# Patient Record
Sex: FEMALE | Race: BLACK OR AFRICAN AMERICAN | Employment: OTHER | URBAN - METROPOLITAN AREA
[De-identification: names, ages, dates, MRNs, and addresses within clinical notes are randomized per-mention and may not be internally consistent; named-entity substitution may affect disease eponyms.]

---

## 2017-03-27 ENCOUNTER — HOSPITAL ENCOUNTER (OUTPATIENT)
Dept: MAMMOGRAPHY | Age: 67
Discharge: HOME | End: 2017-03-27
Attending: INTERNAL MEDICINE
Payer: MEDICARE

## 2017-03-27 DIAGNOSIS — N64.89 BREAST ASYMMETRY: ICD-10-CM

## 2017-03-27 PROCEDURE — 77065 DX MAMMO INCL CAD UNI: CPT

## 2017-10-11 ENCOUNTER — HOSPITAL ENCOUNTER (OUTPATIENT)
Dept: MAMMOGRAPHY | Age: 67
Discharge: HOME | End: 2017-10-11
Attending: INTERNAL MEDICINE
Payer: MEDICARE

## 2017-10-11 DIAGNOSIS — Z12.31 VISIT FOR SCREENING MAMMOGRAM: ICD-10-CM

## 2017-10-11 PROCEDURE — 77067 SCR MAMMO BI INCL CAD: CPT

## 2017-12-13 ENCOUNTER — HOSPITAL ENCOUNTER (OUTPATIENT)
Age: 67
Setting detail: OBSERVATION
Discharge: HOME | End: 2017-12-14
Attending: EMERGENCY MEDICINE | Admitting: INTERNAL MEDICINE
Payer: MEDICARE

## 2017-12-13 ENCOUNTER — APPOINTMENT (OUTPATIENT)
Dept: CT IMAGING | Age: 67
End: 2017-12-13
Attending: EMERGENCY MEDICINE
Payer: MEDICARE

## 2017-12-13 DIAGNOSIS — G45.9 TRANSIENT CEREBRAL ISCHEMIA, UNSPECIFIED TYPE: Primary | ICD-10-CM

## 2017-12-13 LAB
ALBUMIN SERPL-MCNC: 3.2 G/DL (ref 3.2–4.6)
ALBUMIN/GLOB SERPL: 0.7 {RATIO} (ref 1.2–3.5)
ALP SERPL-CCNC: 69 U/L (ref 50–136)
ALT SERPL-CCNC: 28 U/L (ref 12–65)
ANION GAP SERPL CALC-SCNC: 13 MMOL/L (ref 7–16)
AST SERPL-CCNC: 60 U/L (ref 15–37)
BASOPHILS # BLD: 0.1 K/UL (ref 0–0.2)
BASOPHILS NFR BLD: 1 % (ref 0–2)
BILIRUB SERPL-MCNC: 0.3 MG/DL (ref 0.2–1.1)
BUN SERPL-MCNC: 11 MG/DL (ref 8–23)
CALCIUM SERPL-MCNC: 8.8 MG/DL (ref 8.3–10.4)
CHLORIDE SERPL-SCNC: 107 MMOL/L (ref 98–107)
CO2 SERPL-SCNC: 21 MMOL/L (ref 21–32)
CREAT SERPL-MCNC: 0.96 MG/DL (ref 0.6–1)
DIFFERENTIAL METHOD BLD: ABNORMAL
EOSINOPHIL # BLD: 0.1 K/UL (ref 0–0.8)
EOSINOPHIL NFR BLD: 2 % (ref 0.5–7.8)
ERYTHROCYTE [DISTWIDTH] IN BLOOD BY AUTOMATED COUNT: 14.7 % (ref 11.9–14.6)
GLOBULIN SER CALC-MCNC: 4.9 G/DL (ref 2.3–3.5)
GLUCOSE SERPL-MCNC: 118 MG/DL (ref 65–100)
HCT VFR BLD AUTO: 45.7 % (ref 35.8–46.3)
HGB BLD-MCNC: 15.1 G/DL (ref 11.7–15.4)
IMM GRANULOCYTES # BLD: 0 K/UL (ref 0–0.5)
IMM GRANULOCYTES NFR BLD AUTO: 0 % (ref 0–5)
LYMPHOCYTES # BLD: 3.6 K/UL (ref 0.5–4.6)
LYMPHOCYTES NFR BLD: 49 % (ref 13–44)
MCH RBC QN AUTO: 28 PG (ref 26.1–32.9)
MCHC RBC AUTO-ENTMCNC: 33 G/DL (ref 31.4–35)
MCV RBC AUTO: 84.6 FL (ref 79.6–97.8)
MONOCYTES # BLD: 0.8 K/UL (ref 0.1–1.3)
MONOCYTES NFR BLD: 12 % (ref 4–12)
NEUTS SEG # BLD: 2.6 K/UL (ref 1.7–8.2)
NEUTS SEG NFR BLD: 36 % (ref 43–78)
PLATELET # BLD AUTO: 121 K/UL (ref 150–450)
PMV BLD AUTO: 10.6 FL (ref 10.8–14.1)
POTASSIUM SERPL-SCNC: 4.9 MMOL/L (ref 3.5–5.1)
POTASSIUM SERPL-SCNC: 6.5 MMOL/L (ref 3.5–5.1)
PROT SERPL-MCNC: 8.1 G/DL (ref 6.3–8.2)
RBC # BLD AUTO: 5.4 M/UL (ref 4.05–5.25)
SODIUM SERPL-SCNC: 141 MMOL/L (ref 136–145)
WBC # BLD AUTO: 7.3 K/UL (ref 4.3–11.1)

## 2017-12-13 PROCEDURE — 93005 ELECTROCARDIOGRAM TRACING: CPT | Performed by: EMERGENCY MEDICINE

## 2017-12-13 PROCEDURE — 80053 COMPREHEN METABOLIC PANEL: CPT | Performed by: EMERGENCY MEDICINE

## 2017-12-13 PROCEDURE — 70450 CT HEAD/BRAIN W/O DYE: CPT

## 2017-12-13 PROCEDURE — 99285 EMERGENCY DEPT VISIT HI MDM: CPT | Performed by: EMERGENCY MEDICINE

## 2017-12-13 PROCEDURE — 99218 HC RM OBSERVATION: CPT

## 2017-12-13 PROCEDURE — 84132 ASSAY OF SERUM POTASSIUM: CPT | Performed by: EMERGENCY MEDICINE

## 2017-12-13 PROCEDURE — 85025 COMPLETE CBC W/AUTO DIFF WBC: CPT | Performed by: EMERGENCY MEDICINE

## 2017-12-13 RX ORDER — HYDRALAZINE HYDROCHLORIDE 20 MG/ML
10 INJECTION INTRAMUSCULAR; INTRAVENOUS
Status: DISCONTINUED | OUTPATIENT
Start: 2017-12-13 | End: 2017-12-14 | Stop reason: HOSPADM

## 2017-12-13 RX ORDER — IBUPROFEN 200 MG
1 TABLET ORAL DAILY
Status: DISCONTINUED | OUTPATIENT
Start: 2017-12-13 | End: 2017-12-13 | Stop reason: SDUPTHER

## 2017-12-13 RX ORDER — IBUPROFEN 200 MG
1 TABLET ORAL EVERY 24 HOURS
Status: DISCONTINUED | OUTPATIENT
Start: 2017-12-13 | End: 2017-12-14 | Stop reason: HOSPADM

## 2017-12-13 NOTE — Clinical Note
Patient Class[de-identified] Observation [913] Type of Bed: Remote Telemetry [29] Reason for Observation: visual eye loss, concern for TIA versus CVA Admitting Diagnosis: TIA (transient ischemic attack) [308881] Admitting Physician: Ezra Reddy [address] [place] [address] [[id number]] Attending Physician: Ezra Reddy [address] [place] [address] [[id number]]

## 2017-12-14 ENCOUNTER — APPOINTMENT (OUTPATIENT)
Dept: ULTRASOUND IMAGING | Age: 67
End: 2017-12-14
Attending: INTERNAL MEDICINE
Payer: MEDICARE

## 2017-12-14 ENCOUNTER — APPOINTMENT (OUTPATIENT)
Dept: MRI IMAGING | Age: 67
End: 2017-12-14
Attending: INTERNAL MEDICINE
Payer: MEDICARE

## 2017-12-14 VITALS
TEMPERATURE: 98.2 F | BODY MASS INDEX: 31.84 KG/M2 | HEART RATE: 72 BPM | HEIGHT: 69 IN | DIASTOLIC BLOOD PRESSURE: 76 MMHG | OXYGEN SATURATION: 98 % | WEIGHT: 215 LBS | SYSTOLIC BLOOD PRESSURE: 150 MMHG | RESPIRATION RATE: 27 BRPM

## 2017-12-14 LAB
ANION GAP SERPL CALC-SCNC: 8 MMOL/L (ref 7–16)
ATRIAL RATE: 83 BPM
BASOPHILS # BLD: 0 K/UL (ref 0–0.2)
BASOPHILS NFR BLD: 1 % (ref 0–2)
BUN SERPL-MCNC: 8 MG/DL (ref 8–23)
CALCIUM SERPL-MCNC: 9 MG/DL (ref 8.3–10.4)
CALCULATED P AXIS, ECG09: 75 DEGREES
CALCULATED R AXIS, ECG10: 34 DEGREES
CALCULATED T AXIS, ECG11: 90 DEGREES
CHLORIDE SERPL-SCNC: 107 MMOL/L (ref 98–107)
CHOLEST SERPL-MCNC: 215 MG/DL
CO2 SERPL-SCNC: 26 MMOL/L (ref 21–32)
CREAT SERPL-MCNC: 0.79 MG/DL (ref 0.6–1)
DIAGNOSIS, 93000: NORMAL
DIFFERENTIAL METHOD BLD: ABNORMAL
EOSINOPHIL # BLD: 0.1 K/UL (ref 0–0.8)
EOSINOPHIL NFR BLD: 1 % (ref 0.5–7.8)
ERYTHROCYTE [DISTWIDTH] IN BLOOD BY AUTOMATED COUNT: 14.3 % (ref 11.9–14.6)
EST. AVERAGE GLUCOSE BLD GHB EST-MCNC: 131 MG/DL
GLUCOSE SERPL-MCNC: 115 MG/DL (ref 65–100)
HBA1C MFR BLD: 6.2 % (ref 4.8–6)
HCT VFR BLD AUTO: 40.6 % (ref 35.8–46.3)
HDLC SERPL-MCNC: 57 MG/DL (ref 40–60)
HDLC SERPL: 3.8 {RATIO}
HGB BLD-MCNC: 13.7 G/DL (ref 11.7–15.4)
IMM GRANULOCYTES # BLD: 0 K/UL (ref 0–0.5)
IMM GRANULOCYTES NFR BLD AUTO: 0 % (ref 0–5)
LDLC SERPL CALC-MCNC: 115.6 MG/DL
LIPID PROFILE,FLP: ABNORMAL
LYMPHOCYTES # BLD: 2.6 K/UL (ref 0.5–4.6)
LYMPHOCYTES NFR BLD: 40 % (ref 13–44)
MAGNESIUM SERPL-MCNC: 2 MG/DL (ref 1.8–2.4)
MCH RBC QN AUTO: 28 PG (ref 26.1–32.9)
MCHC RBC AUTO-ENTMCNC: 33.7 G/DL (ref 31.4–35)
MCV RBC AUTO: 82.9 FL (ref 79.6–97.8)
MONOCYTES # BLD: 0.6 K/UL (ref 0.1–1.3)
MONOCYTES NFR BLD: 8 % (ref 4–12)
NEUTS SEG # BLD: 3.3 K/UL (ref 1.7–8.2)
NEUTS SEG NFR BLD: 50 % (ref 43–78)
P-R INTERVAL, ECG05: 150 MS
PHOSPHATE SERPL-MCNC: 3.2 MG/DL (ref 2.3–3.7)
PLATELET # BLD AUTO: 240 K/UL (ref 150–450)
PMV BLD AUTO: 9.7 FL (ref 10.8–14.1)
POTASSIUM SERPL-SCNC: 3.8 MMOL/L (ref 3.5–5.1)
Q-T INTERVAL, ECG07: 398 MS
QRS DURATION, ECG06: 88 MS
QTC CALCULATION (BEZET), ECG08: 467 MS
RBC # BLD AUTO: 4.9 M/UL (ref 4.05–5.25)
SODIUM SERPL-SCNC: 141 MMOL/L (ref 136–145)
TRIGL SERPL-MCNC: 212 MG/DL (ref 35–150)
VENTRICULAR RATE, ECG03: 83 BPM
VLDLC SERPL CALC-MCNC: 42.4 MG/DL (ref 6–23)
WBC # BLD AUTO: 6.6 K/UL (ref 4.3–11.1)

## 2017-12-14 PROCEDURE — 83735 ASSAY OF MAGNESIUM: CPT | Performed by: INTERNAL MEDICINE

## 2017-12-14 PROCEDURE — 99218 HC RM OBSERVATION: CPT

## 2017-12-14 PROCEDURE — 93880 EXTRACRANIAL BILAT STUDY: CPT

## 2017-12-14 PROCEDURE — 74011250637 HC RX REV CODE- 250/637: Performed by: INTERNAL MEDICINE

## 2017-12-14 PROCEDURE — 80061 LIPID PANEL: CPT | Performed by: INTERNAL MEDICINE

## 2017-12-14 PROCEDURE — 70551 MRI BRAIN STEM W/O DYE: CPT

## 2017-12-14 PROCEDURE — G8979 MOBILITY GOAL STATUS: HCPCS

## 2017-12-14 PROCEDURE — 84100 ASSAY OF PHOSPHORUS: CPT | Performed by: INTERNAL MEDICINE

## 2017-12-14 PROCEDURE — 85025 COMPLETE CBC W/AUTO DIFF WBC: CPT | Performed by: INTERNAL MEDICINE

## 2017-12-14 PROCEDURE — G8996 SWALLOW CURRENT STATUS: HCPCS

## 2017-12-14 PROCEDURE — G8997 SWALLOW GOAL STATUS: HCPCS

## 2017-12-14 PROCEDURE — G8978 MOBILITY CURRENT STATUS: HCPCS

## 2017-12-14 PROCEDURE — 97161 PT EVAL LOW COMPLEX 20 MIN: CPT

## 2017-12-14 PROCEDURE — 92610 EVALUATE SWALLOWING FUNCTION: CPT

## 2017-12-14 PROCEDURE — 83036 HEMOGLOBIN GLYCOSYLATED A1C: CPT | Performed by: INTERNAL MEDICINE

## 2017-12-14 PROCEDURE — 80048 BASIC METABOLIC PNL TOTAL CA: CPT | Performed by: INTERNAL MEDICINE

## 2017-12-14 PROCEDURE — G8980 MOBILITY D/C STATUS: HCPCS

## 2017-12-14 PROCEDURE — 74011250636 HC RX REV CODE- 250/636: Performed by: INTERNAL MEDICINE

## 2017-12-14 PROCEDURE — G8998 SWALLOW D/C STATUS: HCPCS

## 2017-12-14 PROCEDURE — 96372 THER/PROPH/DIAG INJ SC/IM: CPT | Performed by: EMERGENCY MEDICINE

## 2017-12-14 RX ORDER — ACETAMINOPHEN 325 MG/1
650 TABLET ORAL
Status: DISCONTINUED | OUTPATIENT
Start: 2017-12-14 | End: 2017-12-14 | Stop reason: HOSPADM

## 2017-12-14 RX ORDER — ATORVASTATIN CALCIUM 40 MG/1
40 TABLET, FILM COATED ORAL DAILY
Qty: 30 TAB | Refills: 11 | Status: SHIPPED | OUTPATIENT
Start: 2017-12-14

## 2017-12-14 RX ORDER — ASPIRIN 325 MG
325 TABLET ORAL DAILY
Status: DISCONTINUED | OUTPATIENT
Start: 2017-12-14 | End: 2017-12-14 | Stop reason: HOSPADM

## 2017-12-14 RX ORDER — ONDANSETRON 2 MG/ML
4 INJECTION INTRAMUSCULAR; INTRAVENOUS
Status: DISCONTINUED | OUTPATIENT
Start: 2017-12-14 | End: 2017-12-14 | Stop reason: HOSPADM

## 2017-12-14 RX ORDER — FACIAL-BODY WIPES
10 EACH TOPICAL DAILY PRN
Status: DISCONTINUED | OUTPATIENT
Start: 2017-12-14 | End: 2017-12-14 | Stop reason: HOSPADM

## 2017-12-14 RX ORDER — HEPARIN SODIUM 5000 [USP'U]/ML
5000 INJECTION, SOLUTION INTRAVENOUS; SUBCUTANEOUS EVERY 8 HOURS
Status: DISCONTINUED | OUTPATIENT
Start: 2017-12-14 | End: 2017-12-14 | Stop reason: SDUPTHER

## 2017-12-14 RX ORDER — HEPARIN SODIUM 5000 [USP'U]/ML
5000 INJECTION, SOLUTION INTRAVENOUS; SUBCUTANEOUS EVERY 8 HOURS
Status: DISCONTINUED | OUTPATIENT
Start: 2017-12-14 | End: 2017-12-14 | Stop reason: HOSPADM

## 2017-12-14 RX ORDER — CLOPIDOGREL BISULFATE 75 MG/1
75 TABLET ORAL DAILY
Qty: 30 TAB | Refills: 2 | Status: SHIPPED | OUTPATIENT
Start: 2017-12-14

## 2017-12-14 RX ORDER — HYDROCHLOROTHIAZIDE 25 MG/1
25 TABLET ORAL DAILY
Qty: 30 TAB | Refills: 11 | Status: SHIPPED | OUTPATIENT
Start: 2017-12-14

## 2017-12-14 RX ORDER — TRAMADOL HYDROCHLORIDE 50 MG/1
50 TABLET ORAL
Status: DISCONTINUED | OUTPATIENT
Start: 2017-12-14 | End: 2017-12-14 | Stop reason: HOSPADM

## 2017-12-14 RX ORDER — SODIUM CHLORIDE 0.9 % (FLUSH) 0.9 %
5-10 SYRINGE (ML) INJECTION AS NEEDED
Status: DISCONTINUED | OUTPATIENT
Start: 2017-12-14 | End: 2017-12-14 | Stop reason: HOSPADM

## 2017-12-14 RX ORDER — SODIUM CHLORIDE 0.9 % (FLUSH) 0.9 %
5-10 SYRINGE (ML) INJECTION EVERY 8 HOURS
Status: DISCONTINUED | OUTPATIENT
Start: 2017-12-14 | End: 2017-12-14 | Stop reason: HOSPADM

## 2017-12-14 RX ORDER — PRAVASTATIN SODIUM 20 MG/1
40 TABLET ORAL
Status: DISCONTINUED | OUTPATIENT
Start: 2017-12-14 | End: 2017-12-14 | Stop reason: HOSPADM

## 2017-12-14 RX ADMIN — HEPARIN SODIUM 5000 UNITS: 5000 INJECTION INTRAVENOUS; SUBCUTANEOUS at 05:29

## 2017-12-14 RX ADMIN — ASPIRIN 325 MG ORAL TABLET 325 MG: 325 PILL ORAL at 09:15

## 2017-12-14 NOTE — ED NOTES
Bedside shift report received from [facility] for continuation of care. Pt daughter at bedside. Respirations even and unlabored. Pt vital signs being monitored.
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
## 2017-12-14 NOTE — ED NOTES
Stroke Education provided to patient and the following topics were discussed    1. Patients personal risk factors for stroke are smoking, hypertension, high cholesterol     2. Warning signs of Stroke:        * Sudden numbness or weakness of the face, arm or leg, especially on one side of           The body            * Sudden confusion, trouble speaking or understanding        * Sudden trouble seeing in one or both eyes        * Sudden trouble walking, dizziness, loss of balance or coordination        * Sudden severe headache with no known cause      3. Importance of activation Emergency Medical Services ( 9-1-1 ) immediately if experience any warning signs of stroke. 4. Be sure and schedule a follow-up appointment with your primary care doctor or any specialists as instructed. 5. You must take medicine every day to treat your risk factors for stroke. Be sure to take your medicines exactly as your doctor tells you: no more, no less. Know what your medicines are for , what they do. Anti-thrombotics /anticoagulants can help prevent strokes. 6.  Smoking and second-hand smoke greatly increase your risk of stroke, cardiovascular disease and death. Smoking history current smoker but trying to quite    7. Information provided was [facility] Stroke Education Binder    8. Stroke Navigator phone number given to patient    patient verbalized understanding.

## 2017-12-14 NOTE — ED NOTES
Verbal report to [facility] for continuation of care. Pt in [address] at this time.

## 2017-12-14 NOTE — PROGRESS NOTES
Problem: Dysphagia (Adult)  Goal: *Acute Goals and Plan of Care (Insert Text)  OBSERVATION Speech language pathology: bedside swallow note: Initial Assessment and Discharge    NAME/AGE/GENDER: Bruna Garcia is a 69 y.o. female  DATE: 12/14/2017  PRIMARY DIAGNOSIS: TIA (transient ischemic attack)       ICD-10: Treatment Diagnosis: dysphagia, other 13.19  INTERDISCIPLINARY COLLABORATION: Registered Nurse  PRECAUTIONS/ALLERGIES: Review of patient's allergies indicates no known allergies. ASSESSMENT:Based on the objective data described below, Ms. Sandoval swallowing is essentially WFL. Pt given trials thin liquids, mixed consistency and pureed. Also observed taking whole medication administered by RN. No signs/sx aspiration. Mastication WFL. Pt's speech and cognition appears intact. Noted CT was negative for an acute event. Therefore, no further ST indicated. PLAN OF CARE:   Patient will benefit from skilled intervention to address the following impairments. RECOMMENDATIONS:  · continue prescribed diet  MEDICATIONS:  · With liquid  COMPENSATORY STRATEGIES/MODIFICATIONS INCLUDING:  · None  OTHER RECOMMENDATIONS (including follow up treatment recommendations):   · NA  RECOMMENDED DIET MODIFICATIONS DISCUSSED WITH:  · Nursing  · Family  · Patient  RECOMMENDED REHABILITATION/EQUIPMENT: (at time of discharge pending progress): Due to the probability of continued deficits (see above) this patient will not likely need continued skilled speech therapy after discharge. SUBJECTIVE:   Pt cooperative. Several family members present. History of Present Injury/Illness: Ms. Mychal Kern  has a past medical history of Hyperlipemia; Hypertension; and TIA (transient ischemic attack) (12/13/2017). She also has no past medical history of Aneurysm ([facility] Arrhythmia; Arthritis; Asthma; Autoimmune disease ([facility] CAD (coronary artery disease); Cancer ([facility]  Chronic kidney disease; Chronic pain; Coagulation defects; COPD; Diabetes ([facility] [facility] Difficult intubation; GERD (gastroesophageal reflux disease); Heart failure ([facility] [facility] Liver disease; Malignant hyperthermia due to anesthesia; Morbid obesity ([facility] Nausea & vomiting; Other ill-defined conditions(799.89); Pseudocholinesterase deficiency; Psychiatric disorder; PUD (peptic ulcer disease); Seizures ([facility] [facility] Thromboembolus ([facility] Thyroid disease; Unspecified adverse effect of anesthesia; or Unspecified sleep apnea. .   She also  has a past surgical history that includes other surgical; orthopaedic; and breast biopsy (Right, date unknown). Present Symptoms: TIA    Pain Intensity 1: 0  Current Medications:   No current facility-administered medications on file prior to encounter. Current Outpatient Prescriptions on File Prior to Encounter   Medication Sig Dispense Refill    multivitamins-minerals-lutein (CENTRUM SILVER) tab tablet Take 1 Tab by mouth daily.  vitamin E (AQUA GEMS) 400 unit capsule Take  by mouth daily.  traMADol (ULTRAM) 50 mg tablet Take 1 Tab by mouth every eight (8) hours as needed for Pain. 20 Tab 0    Cholecalciferol, Vitamin D3, (VITAMIN D-3) 1,000 unit Chew Take 1 Tab by mouth daily.  aspirin 81 mg tablet Take 81 mg by mouth daily. Hold 5 days prior to surgery         Current Dietary Status:  Regular       History of reflux:  NO    Reflux medication:   Social History/Home Situation: residing with one of her granddaughters      OBJECTIVE:   Respiratory Status:        CXR Results: not ordered   MRI/CT Results: 1. No evidence of acute infarction. 2. Relatively mild chronic small vessel ischemic change. Oral Motor Structure/Speech:  Oral-Motor Structure/Motor Speech  Labial: No impairment  Dentition: Upper & lower dentures  Oral Hygiene: adequate  Lingual: No impairment    Cognitive and Communication Status:  Neurologic State: Alert  Orientation Level: Oriented X4  Cognition: Appropriate decision making; Appropriate for age attention/concentration; Appropriate safety awareness; Follows commands             BEDSIDE SWALLOW EVALUATION  Oral Assessment:  Oral Assessment  Labial: No impairment  Dentition: Upper & lower dentures  Oral Hygiene: adequate  Lingual: No impairment  P.O. Trials:  Patient Position: upright in bed    The patient was given teaspoon to straw amounts of the following:   Consistency Presented: Mixed consistency; Solid; Thin liquid; pill  How Presented: Self-fed/presented;Cup/sip;Spoon;Straw;Successive swallows    ORAL PHASE:  Bolus Acceptance: No impairment  Bolus Formation/Control: No impairment  Propulsion: No impairment     Oral Residue: None    PHARYNGEAL PHASE:  Initiation of Swallow: No impairment  Laryngeal Elevation: Functional  Aspiration Signs/Symptoms: None  Vocal Quality: No impairment                OTHER OBSERVATIONS:  Rate/bite size: WNL   Endurance: WNL   Comments: Tool Used: Dysphagia Outcome and Severity Scale (TRACI)    Score Comments   Normal Diet  [] 7 With no strategies or extra time needed   Functional Swallow  [] 6 May have mild oral or pharyngeal delay       Mild Dysphagia    [] 5 Which may require one diet consistency restricted (those who demonstrate penetration which is entirely cleared on MBS would be included)   Mild-Moderate Dysphagia  [] 4 With 1-2 diet consistencies restricted       Moderate Dysphagia  [] 3 With 2 or more diet consistencies restricted       Moderately Severe Dysphagia  [] 2 With partial PO strategies (trials with ST only)       Severe Dysphagia  [] 1 With inability to tolerate any PO safely          Score:  Initial: 7 Most Recent: X (Date: -- )   Interpretation of Tool: The Dysphagia Outcome and Severity Scale (TRACI) is a simple, easy-to-use, 7-point scale developed to systematically rate the functional severity of dysphagia based on objective assessment and make recommendations for diet level, independence level, and type of nutrition.      Score 7 6 5 4 3 2 1 Modifier CH CI CJ CK CL CM CN   ?  Swallowing:     - CURRENT STATUS: CH - 0% impaired, limited or restricted    - GOAL STATUS:  CH - 0% impaired, limited or restricted    - D/C STATUS:  [address] - 0% impaired, limited or restricted  Payor: Kati Clemons / Plan: [facility] / Product Type: Managed Care Medicare /     TREATMENT:    (In addition to Assessment/Re-Assessment sessions the following treatments were rendered)  Assessment/Reassessment only, no treatment provided today          __________________________________________________________________________________________________  Safety:   After treatment position/precautions:  · Upright in Bed    Total Treatment Duration:  Time In: 0902  Time Out: Damion 56, iBta Ayala 43., CCC-SLP

## 2017-12-14 NOTE — PROGRESS NOTES
Met with patient in the ER. Patient states her granddaughter lives with her at home. Patient drives. Patient does not use a cane or walker. Patient is not on any home O2. Patient sees Dr. Scott Pinon (PCP). Patient [facility] not had any recent [facility] or rehab. Patient does not express any needs at this time.
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
## 2017-12-14 NOTE — ED NOTES
Pt resting in bed, family at bedside. Pt denies any additional needs at this time. Pt respirations even and unlabored.
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
## 2017-12-14 NOTE — ED NOTES
I have reviewed discharge instructions with the patient. The patient verbalized understanding. Patient left ED via Discharge Method: ambulatory to Home with self. Opportunity for questions and clarification provided. Patient given 3 scripts. To continue your aftercare when you leave the hospital, you may receive an automated call from our care team to check in on how you are doing. This is a free service and part of our promise to provide the best care and service to meet your aftercare needs.  If you have questions, or wish to unsubscribe from this service please call [phone]. Thank you for Choosing our [facility] Emergency Department.
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
## 2017-12-14 NOTE — DISCHARGE INSTRUCTIONS
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
## 2018-10-29 ENCOUNTER — HOSPITAL ENCOUNTER (OUTPATIENT)
Dept: MAMMOGRAPHY | Age: 68
Discharge: HOME | End: 2018-10-29
Attending: INTERNAL MEDICINE

## 2018-10-29 DIAGNOSIS — Z12.31 VISIT FOR SCREENING MAMMOGRAM: ICD-10-CM

## 2018-10-31 ENCOUNTER — HOSPITAL ENCOUNTER (OUTPATIENT)
Dept: MAMMOGRAPHY | Age: 68
Discharge: HOME | End: 2018-10-31
Attending: INTERNAL MEDICINE
Payer: MEDICARE

## 2018-10-31 PROCEDURE — 77067 SCR MAMMO BI INCL CAD: CPT

## 2019-01-12 ENCOUNTER — HOSPITAL ENCOUNTER (EMERGENCY)
Age: 69
Discharge: HOME | End: 2019-01-12
Attending: EMERGENCY MEDICINE
Payer: COMMERCIAL

## 2019-01-12 ENCOUNTER — APPOINTMENT (OUTPATIENT)
Dept: GENERAL RADIOLOGY | Age: 69
End: 2019-01-12
Payer: COMMERCIAL

## 2019-01-12 VITALS
HEIGHT: 69 IN | BODY MASS INDEX: 31.99 KG/M2 | SYSTOLIC BLOOD PRESSURE: 142 MMHG | WEIGHT: 216 LBS | DIASTOLIC BLOOD PRESSURE: 72 MMHG | TEMPERATURE: 97.9 F | HEART RATE: 81 BPM | RESPIRATION RATE: 16 BRPM | OXYGEN SATURATION: 98 %

## 2019-01-12 DIAGNOSIS — M54.16 LUMBAR RADICULAR SYNDROME: ICD-10-CM
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
## 2019-01-12 DIAGNOSIS — S39.012A LUMBAR STRAIN, INITIAL ENCOUNTER: ICD-10-CM

## 2019-01-12 PROCEDURE — 72100 X-RAY EXAM L-S SPINE 2/3 VWS: CPT

## 2019-01-12 PROCEDURE — 73552 X-RAY EXAM OF FEMUR 2/>: CPT

## 2019-01-12 PROCEDURE — 99283 EMERGENCY DEPT VISIT LOW MDM: CPT | Performed by: PHYSICIAN ASSISTANT

## 2019-01-12 RX ORDER — TRAMADOL HYDROCHLORIDE 50 MG/1
50 TABLET ORAL
Qty: 20 TAB | Refills: 0 | Status: SHIPPED | OUTPATIENT
Start: 2019-01-12

## 2019-01-12 RX ORDER — CYCLOBENZAPRINE HCL 5 MG
5 TABLET ORAL
Qty: 20 TAB | Refills: 0 | Status: SHIPPED | OUTPATIENT
Start: 2019-01-12
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
## 2019-12-02 NOTE — ED NOTES
Pt taken to MRI. Home Suture Removal Text: Patient was provided a home suture removal kit and will remove their sutures at home.  If they have any questions or difficulties they will call the office.

## 2020-06-03 ENCOUNTER — HOSPITAL ENCOUNTER (OUTPATIENT)
Dept: MAMMOGRAPHY | Age: 70
Discharge: HOME | End: 2020-06-03
Attending: INTERNAL MEDICINE
Payer: COMMERCIAL

## 2020-06-03 DIAGNOSIS — Z12.31 VISIT FOR SCREENING MAMMOGRAM: ICD-10-CM

## 2020-06-03 PROCEDURE — 77067 SCR MAMMO BI INCL CAD: CPT

## 2021-04-09 PROBLEM — M70.21 OLECRANON BURSITIS OF RIGHT ELBOW: Status: ACTIVE | Noted: 2021-04-09

## 2021-05-07 ENCOUNTER — TRANSCRIBE ORDER (OUTPATIENT)
Dept: SCHEDULING | Age: 71
End: 2021-05-07

## 2021-05-07 DIAGNOSIS — Z12.31 SCREENING MAMMOGRAM FOR HIGH-RISK PATIENT: Primary | ICD-10-CM

## 2021-06-10 ENCOUNTER — APPOINTMENT (RX ONLY)
Dept: URBAN - METROPOLITAN AREA CLINIC 349 | Facility: CLINIC | Age: 71
Setting detail: DERMATOLOGY
End: 2021-06-10

## 2021-06-10 DIAGNOSIS — D17 BENIGN LIPOMATOUS NEOPLASM: ICD-10-CM

## 2021-06-10 PROBLEM — D17.22 BENIGN LIPOMATOUS NEOPLASM OF SKIN AND SUBCUTANEOUS TISSUE OF LEFT ARM: Status: ACTIVE | Noted: 2021-06-10

## 2021-06-10 PROBLEM — D23.72 OTHER BENIGN NEOPLASM OF SKIN OF LEFT LOWER LIMB, INCLUDING HIP: Status: ACTIVE | Noted: 2021-06-10

## 2021-06-10 PROBLEM — D17.21 BENIGN LIPOMATOUS NEOPLASM OF SKIN AND SUBCUTANEOUS TISSUE OF RIGHT ARM: Status: ACTIVE | Noted: 2021-06-10

## 2021-06-10 PROCEDURE — ? COUNSELING

## 2021-06-10 PROCEDURE — 99202 OFFICE O/P NEW SF 15 MIN: CPT

## 2021-06-10 ASSESSMENT — LOCATION DETAILED DESCRIPTION DERM
LOCATION DETAILED: RIGHT ANTERIOR PROXIMAL UPPER ARM
LOCATION DETAILED: LEFT ANTERIOR PROXIMAL UPPER ARM

## 2021-06-10 ASSESSMENT — LOCATION ZONE DERM: LOCATION ZONE: ARM

## 2021-06-10 ASSESSMENT — LOCATION SIMPLE DESCRIPTION DERM
LOCATION SIMPLE: LEFT UPPER ARM
LOCATION SIMPLE: RIGHT UPPER ARM

## 2021-07-20 ENCOUNTER — TRANSCRIBE ORDER (OUTPATIENT)
Dept: SCHEDULING | Age: 71
End: 2021-07-20

## 2021-07-20 DIAGNOSIS — E04.9 GOITER: Primary | ICD-10-CM

## 2021-07-26 ENCOUNTER — HOSPITAL ENCOUNTER (OUTPATIENT)
Dept: ULTRASOUND IMAGING | Age: 71
Discharge: HOME | End: 2021-07-26
Attending: INTERNAL MEDICINE
Payer: MEDICARE

## 2021-07-26 DIAGNOSIS — E04.9 GOITER: ICD-10-CM

## 2021-07-26 PROCEDURE — 76536 US EXAM OF HEAD AND NECK: CPT

## 2021-10-11 ENCOUNTER — HOSPITAL ENCOUNTER (EMERGENCY)
Age: 71
Discharge: HOME | End: 2021-10-11
Attending: EMERGENCY MEDICINE
Payer: MEDICARE

## 2021-10-11 VITALS
HEART RATE: 78 BPM | DIASTOLIC BLOOD PRESSURE: 75 MMHG | SYSTOLIC BLOOD PRESSURE: 134 MMHG | OXYGEN SATURATION: 97 % | TEMPERATURE: 98 F | HEIGHT: 69 IN | WEIGHT: 216 LBS | BODY MASS INDEX: 31.99 KG/M2 | RESPIRATION RATE: 17 BRPM

## 2021-10-11 DIAGNOSIS — S80.01XA CONTUSION OF RIGHT KNEE, INITIAL ENCOUNTER: Primary | ICD-10-CM

## 2021-10-11 DIAGNOSIS — S00.83XA CONTUSION OF FOREHEAD, INITIAL ENCOUNTER: ICD-10-CM

## 2021-10-11 PROCEDURE — 99283 EMERGENCY DEPT VISIT LOW MDM: CPT
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
## 2021-10-11 NOTE — ED NOTES
Left knee wound cleaned, non stick dressing applied and wrapped with kerlex and ace wrap as ordered.
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
## 2021-10-11 NOTE — ED NOTES
I have reviewed discharge instructions with the patient. The patient verbalized understanding. Patient left ED via Discharge Method: ambulatory to Home with self. Opportunity for questions and clarification provided. Patient given 0 scripts. To continue your aftercare when you leave the hospital, you may receive an automated call from our care team to check in on how you are doing. This is a free service and part of our promise to provide the best care and service to meet your aftercare needs.  If you have questions, or wish to unsubscribe from this service please call [phone]. Thank you for Choosing our [facility] Emergency Department.

## 2021-10-11 NOTE — DISCHARGE INSTRUCTIONS
Monitor your symptoms at home. Keep the right knee covered in bandage. Tylenol for pain. Ice the areas that are swollen.   If anything changes or worsens return to the ER for further evaluation

## 2021-11-02 ENCOUNTER — HOSPITAL ENCOUNTER (OUTPATIENT)
Dept: GENERAL RADIOLOGY | Age: 71
Discharge: HOME | End: 2021-11-02
Attending: INTERNAL MEDICINE
Payer: MEDICARE

## 2021-11-02 DIAGNOSIS — R05.9 COUGH: ICD-10-CM

## 2021-11-02 PROCEDURE — 71046 X-RAY EXAM CHEST 2 VIEWS: CPT

## 2021-12-28 ENCOUNTER — HOSPITAL ENCOUNTER (OUTPATIENT)
Dept: GENERAL RADIOLOGY | Age: 71
Discharge: HOME | End: 2021-12-28
Payer: MEDICARE

## 2021-12-28 DIAGNOSIS — R05.9 COUGH: ICD-10-CM

## 2021-12-28 PROCEDURE — 71046 X-RAY EXAM CHEST 2 VIEWS: CPT

## 2022-01-25 PROBLEM — R91.1 LUNG NODULE: Status: ACTIVE | Noted: 2022-01-25

## 2022-01-25 PROBLEM — G45.9 TIA (TRANSIENT ISCHEMIC ATTACK): Status: RESOLVED | Noted: 2017-12-13 | Resolved: 2022-01-25

## 2022-03-18 PROBLEM — R91.1 LUNG NODULE: Status: ACTIVE | Noted: 2022-01-25

## 2022-03-18 PROBLEM — M70.21 OLECRANON BURSITIS OF RIGHT ELBOW: Status: ACTIVE | Noted: 2021-04-09

## 2022-06-23 ENCOUNTER — HOSPITAL ENCOUNTER (OUTPATIENT)
Dept: MAMMOGRAPHY | Age: 72
Discharge: HOME | End: 2022-06-26
Payer: MEDICARE

## 2022-06-23 DIAGNOSIS — Z12.31 VISIT FOR SCREENING MAMMOGRAM: ICD-10-CM

## 2022-06-23 PROCEDURE — 77067 SCR MAMMO BI INCL CAD: CPT

## 2022-07-14 ENCOUNTER — OFFICE VISIT (OUTPATIENT)
Dept: PULMONOLOGY | Age: 72
End: 2022-07-14
Payer: MEDICARE

## 2022-07-14 VITALS
DIASTOLIC BLOOD PRESSURE: 79 MMHG | SYSTOLIC BLOOD PRESSURE: 175 MMHG | RESPIRATION RATE: 14 BRPM | HEIGHT: 69 IN | HEART RATE: 88 BPM | WEIGHT: 235 LBS | BODY MASS INDEX: 34.8 KG/M2 | TEMPERATURE: 97.2 F | OXYGEN SATURATION: 98 %

## 2022-07-14 DIAGNOSIS — G47.33 OSA ON CPAP: Primary | ICD-10-CM

## 2022-07-14 DIAGNOSIS — E04.2 MULTINODULAR GOITER: ICD-10-CM

## 2022-07-14 DIAGNOSIS — J44.9 CHRONIC OBSTRUCTIVE PULMONARY DISEASE, UNSPECIFIED COPD TYPE (HCC): ICD-10-CM

## 2022-07-14 DIAGNOSIS — Z87.891 FORMER TOBACCO USE: ICD-10-CM

## 2022-07-14 DIAGNOSIS — Z99.89 OSA ON CPAP: Primary | ICD-10-CM

## 2022-07-14 PROCEDURE — 1123F ACP DISCUSS/DSCN MKR DOCD: CPT | Performed by: NURSE PRACTITIONER

## 2022-07-14 PROCEDURE — 99214 OFFICE O/P EST MOD 30 MIN: CPT | Performed by: NURSE PRACTITIONER

## 2022-07-14 RX ORDER — FLUTICASONE PROPIONATE 220 UG/1
2 AEROSOL, METERED RESPIRATORY (INHALATION) 2 TIMES DAILY
Qty: 1 EACH | Refills: 11 | Status: SHIPPED | OUTPATIENT
Start: 2022-07-14 | End: 2023-07-14

## 2022-07-14 ASSESSMENT — ENCOUNTER SYMPTOMS
CHEST TIGHTNESS: 0
WHEEZING: 1
COUGH: 0
SORE THROAT: 0
SHORTNESS OF BREATH: 1

## 2022-07-14 NOTE — PROGRESS NOTES
[address] Adryan Salguero. [address] [facility], [address] [facility]  [phone]      Patient Name:  Brody Romo  YOB: 1950  MRN:  [id number]      Office Visit 2022       Chief Complaint   Patient presents with    Follow-up    Breathing Problem         HISTORY OF PRESENT ILLNESS:       Ms. Tammy Carter is a very pleasant 74year old female, PMH HTN, HLD, goiter, and TIA here today for follow up for shortness of breath. The patient previously had CPFTs done that revealed COPD. The patient is a former smoker of 50+ years but quit two years ago and has remained tobacco free. Today the patient states feeling \"okay\" but still has intermittent wheezing and some shortness of breath. The patient endorses using Flovent 44 BID with some control of symptoms. She denies any fevers, chills, nausea, or vomiting. She is concerned about a goiter and would like referral to Dr. Niya Harmon here in [place]. Past Medical History:   Diagnosis Date    Essential hypertension     Hyperlipidemia     Multinodular goiter     Multinodular goiter     TIA (transient ischemic attack) 2017           Past Surgical History:   Procedure Laterality Date    BREAST BIOPSY Right date unknown    benign per patient    ORTHOPEDIC SURGERY      trigger fnger release    OTHER SURGICAL HISTORY      ganglion cyst removal left wrist           Social History     Socioeconomic History    Marital status: Single     Spouse name: Not on file    Number of children: Not on file    Years of education: Not on file    Highest education level: Not on file   Occupational History    Not on file   Tobacco Use    Smoking status: Former Smoker     Packs/day: 1.00     Years: 40.00     Pack years: 40.00     Quit date: 2020     Years since quittin.5    Smokeless tobacco: Never Used   Substance and Sexual Activity    Alcohol use:  Yes     Alcohol/week: 2.5 standard drinks    Drug use: No    Sexual activity: Not on file Other Topics Concern    Not on file   Social History Narrative    Not on file     Social Determinants of Health     Financial Resource Strain:     Difficulty of Paying Living Expenses: Not on file   Food Insecurity:     Worried About Running Out of Food in the Last Year: Not on file    Rigo of Food in the Last Year: Not on file   Transportation Needs:     Lack of Transportation (Medical): Not on file    Lack of Transportation (Non-Medical): Not on file   Physical Activity:     Days of Exercise per Week: Not on file    Minutes of Exercise per Session: Not on file   Stress:     Feeling of Stress : Not on file   Social Connections:     Frequency of Communication with Friends and Family: Not on file    Frequency of Social Gatherings with Friends and Family: Not on file    Attends Zoroastrianism Services: Not on file    Active Member of [address] [place] [address] [place] or Organizations: Not on file    Attends Club or Organization Meetings: Not on file    Marital Status: Not on file   Intimate Partner Violence:     Fear of Current or Ex-Partner: Not on file    Emotionally Abused: Not on file    Physically Abused: Not on file    Sexually Abused: Not on file   Housing Stability:     Unable to Pay for Housing in the Last Year: Not on file    Number of [place] in the Last Year: Not on file    Unstable Housing in the Last Year: Not on file         Family History   Problem Relation Age of Onset    Thyroid Disease Son         hypothyroidism    Malig Hypertherm Neg Hx     Pseudochol. Deficiency Neg Hx     Delayed Awakening Neg Hx     Post-op Nausea/Vomiting Neg Hx     Emergence Delirium Neg Hx     Post-op Cognitive Dysfunction Neg Hx     Other Neg Hx     Breast Cancer Neg Hx     Thyroid Cancer Neg Hx     Diabetes Mother     Hypertension Mother     Cancer Brother          Allergies   Allergen Reactions    Ace Inhibitors Other (See Comments)     On paperwork from PCP, pt does not remember if she is allergic. Current Outpatient Medications   Medication Sig    fluticasone (FLOVENT HFA) 220 MCG/ACT inhaler Inhale 2 puffs into the lungs 2 times daily    albuterol sulfate  (90 Base) MCG/ACT inhaler Inhale 2 puffs into the lungs    atorvastatin (LIPITOR) 40 MG tablet Take 40 mg by mouth daily    Cholecalciferol 25 MCG (1000 UT) CHEW Take 1 tablet by mouth daily    clopidogrel (PLAVIX) 75 MG tablet Take 75 mg by mouth daily    cyclobenzaprine (FLEXERIL) 5 MG tablet Take 5 mg by mouth 3 times daily as needed    diclofenac (VOLTAREN) 75 MG EC tablet 1 tablet    fluticasone (FLONASE) 50 MCG/ACT nasal spray 2 sprays by Nasal route daily as needed    hydroCHLOROthiazide (HYDRODIURIL) 25 MG tablet Take 25 mg by mouth daily    ibuprofen (ADVIL;MOTRIN) 200 MG tablet 1 tablet    latanoprost (XALATAN) 0.005 % ophthalmic solution Apply to eye    loratadine (CLARITIN) 10 MG capsule Take 10 mg by mouth daily as needed    traMADol (ULTRAM) 50 MG tablet Take 50 mg by mouth every 8 hours as needed.  vitamin E 400 UNIT capsule Take by mouth daily     No current facility-administered medications for this visit. Review of Systems   Constitutional: Negative for appetite change, fatigue and fever. HENT: Negative for congestion, nosebleeds and sore throat. Respiratory: Positive for shortness of breath and wheezing. Negative for cough and chest tightness. Cardiovascular: Negative for chest pain, palpitations and leg swelling. Neurological: Negative for dizziness, facial asymmetry and headaches. PHYSICAL EXAM:    BP (!) 175/79   Pulse 88   Temp 97.2 °F (36.2 °C)   Resp 14   Ht 5' 9\" (1.753 m)   Wt 235 lb (106.6 kg)   SpO2 98%   BMI 34.70 kg/m²        GENERAL APPEARANCE:   The patient is over weight and in no respiratory distress. HEENT:   PERRL. Conjunctivae unremarkable. Nasal mucosa is without epistaxis, exudate, or polyps. Gums and dentition are unremarkable.   There is no oropharyngeal narrowing. TMs are clear. NECK/LYMPHATIC:   Symmetrical with no elevation of jugular venous pulsation. Trachea midline. No thyroid enlargement. No cervical adenopathy. LUNGS:   Normal respiratory effort with symmetrical lung expansion. Breath sounds clear bilaterally. HEART:   There is a regular rate and rhythm. No murmur, rub, or gallop. There is no edema in the lower extremities. ABDOMEN:   Soft and non-tender. No hepatosplenomegaly. Bowel sounds are normal.     NEURO:   The patient is alert and oriented to person, place, and time. Memory appears intact and mood is normal.  No gross sensorimotor deficits are present. DIAGNOSTIC TESTS:      CXR: 12/28/21         Spirometry:              ASSESSMENT:  (Medical Decision Making)      Diagnosis Orders   1. ANNITA on CPAP     2. Chronic obstructive pulmonary disease, unspecified COPD type ([facility] [address]     3. Former tobacco use     4.  Multinodular goiter  External Referral to Endocrinology        PLAN:    --Increase Flovent HFA to 220 mcg two puffs BID  --Continue albuterol inhaler as needed  --Continue tobacco free lifestyle  --Referral to Dr. Joe Galindo per patient request  --Follow up in 6 months, sooner if needed    Orders Placed This Encounter    External Referral to Endocrinology     Referral Priority:   Routine     Referral Type:   Eval and Treat     Referral Reason:   Specialty Services Required     Requested Specialty:   Endocrinology     Number of Visits Requested:   1    fluticasone (FLOVENT HFA) 220 MCG/ACT inhaler     Sig: Inhale 2 puffs into the lungs 2 times daily     Dispense:  1 each     Refill:  11     Orders Placed This Encounter   Medications    fluticasone (FLOVENT HFA) 220 MCG/ACT inhaler     Sig: Inhale 2 puffs into the lungs 2 times daily     Dispense:  1 each     Refill:  11   Collaborating physician:  Dr. Carlos Garcia, NP, APRN - CNP  Electronically signed    Over 50% of today's office visit was spent in face to face time reviewing test results/records, prognosis, importance of compliance, education about disease process, benefits of medications, instructions for management of acute flare-ups, and follow up plans. Total time spent was 25 minutes. Dictated using voice recognition software.   Proof read but unrecognized errors may exist.

## 2022-08-01 ENCOUNTER — TELEPHONE (OUTPATIENT)
Dept: SLEEP MEDICINE | Age: 72
End: 2022-08-01

## 2022-08-01 NOTE — TELEPHONE ENCOUNTER
Dr. Angele Severs office called to state that pt needs to referral to see Dr. Arabella Gaucher before being seen at Endocrine Surgery. Please call her if there are questions.

## 2022-08-04 DIAGNOSIS — E04.2 MULTINODULAR GOITER: Primary | ICD-10-CM

## 2022-08-04 NOTE — PROGRESS NOTES
Orders Placed This Encounter    External Referral to Endocrinology     Referral Priority:   Routine     Referral Type:   Eval and Treat     Referral Reason:   Specialty Services Required     Referred to Provider:   Francie Alonzo MD     Requested Specialty:   Endocrinology     Number of Visits Requested:   1    Martínez Patel NP, APRN - CNP

## 2023-01-20 ENCOUNTER — OFFICE VISIT (OUTPATIENT)
Dept: PULMONOLOGY | Age: 73
End: 2023-01-20

## 2023-01-20 VITALS
OXYGEN SATURATION: 96 % | RESPIRATION RATE: 20 BRPM | WEIGHT: 222 LBS | HEIGHT: 69 IN | BODY MASS INDEX: 32.88 KG/M2 | SYSTOLIC BLOOD PRESSURE: 140 MMHG | TEMPERATURE: 98 F | DIASTOLIC BLOOD PRESSURE: 80 MMHG | HEART RATE: 93 BPM

## 2023-01-20 DIAGNOSIS — Z87.891 PERSONAL HISTORY OF TOBACCO USE: Primary | ICD-10-CM

## 2023-01-20 DIAGNOSIS — R91.1 SOLITARY PULMONARY NODULE: ICD-10-CM

## 2023-01-20 DIAGNOSIS — Z12.2 SCREENING FOR LUNG CANCER: ICD-10-CM

## 2023-01-20 DIAGNOSIS — J44.9 CHRONIC OBSTRUCTIVE PULMONARY DISEASE, UNSPECIFIED COPD TYPE (HCC): ICD-10-CM

## 2023-01-20 NOTE — PATIENT INSTRUCTIONS

## 2023-01-20 NOTE — PROGRESS NOTES
Name:  Faith Irizarry  YOB: 1950   MRN: [id number]      Office Visit: 1/20/2023        ASSESSMENT AND PLAN:  (Medical Decision Making)    Impression:   Pt with former tobacco abuse. COPD that seems well controlled on prn albuterol.   Previously seen lung nodules, never got the recommended CT scan. Qualifies for lung cancer screening.     1. Personal history of tobacco use  Still not smoking, though did a few months ago. Encouarged ongoing abstinence.     - [place] VISIT TO DISCUSS LUNG CA SCREEN W LDCT  - CT Lung Screen (Annual/Baseline); Future    2. Chronic obstructive pulmonary disease, unspecified COPD type (HCC)  -cont prn albuterol. If need increases will start Anoro    3. Solitary pulmonary nodule  -ct scan as above.     4. Screening for lung cancer  -ct scan.     F/u in 1 year.     No orders of the defined types were placed in this encounter.        Procedures    [place] VISIT TO DISCUSS LUNG CA SCREEN W LDCT     Follow-up and Dispositions    Return in about 1 year (around 1/20/2024) for CT Prior to Appointment, With [place].       Devin Jeffers MD    No specialty comments available.      Total time for encounter on day of encounter was 20 minutes.  This time includes chart prep, review of tests/procedures, review of other provider's notes, documentation and counseling patient regarding disease process and medications.   _________________________________________________________________________    HISTORY OF PRESENT ILLNESS:    Ms. Faith Irizarry is a 72 y.o. female who is seen at [facility] today for  Pulmonary Nodule   She has a history of HTN, GERD, gout, HLD, asthma.   Referred for persistent RLL nodules are CXR Nov 2022.   Former smoker, quit end of 2021 after 50 pk/yrs.     Plan at her last appt was to get a CT chest (never done), cPFT's (restriction on spirometry, normal lung volumes, and DLCO in the high 60's) and use prn albuterol.     She states since her last appointment she  had a surgery on her thryoid. Also had a lymph node removed. She states her breathing is doing well since her last appointment. Some better after her thryoid surgery. Using albuterol typically every other day when walking. She is still not smoking. REVIEW OF SYSTEMS: 10 point review of systems is negative except as reported in HPI. PHYSICAL EXAM: Body mass index is 32.78 kg/m². Vitals:    01/20/23 1536   BP: (!) 140/80   Pulse: 93   Resp: 20   Temp: 98 °F (36.7 °C)   TempSrc: Temporal   SpO2: 96%   Weight: 222 lb (100.7 kg)   Height: 5' 9\" (1.753 m)         General:   Alert, cooperative, no distress, appears stated age. Eyes:   Conjunctivae/corneas clear. PERRL        Mouth/Throat:  Lips, mucosa, and tongue normal. Teeth and gums normal. Bandage over thyroid surgery site. Lungs:     CTA B, no w/r/r     Heart:   Regular rate and rhythm, S1, S2 normal, no murmur, click, rub or gallop. Abdomen:    Soft, non-tender. Extremities:  Extremities normal, atraumatic, no cyanosis or edema. Skin:  Skin color normal. No rashes or lesions     Neurologic:  A&Ox3     DIAGNOSTIC TESTS:                                                                                    LABS: No results found for: WBC, HGB, HCT, PLT, TSH, IGE, NTPROBNP, ROSEANN, ANCA, RF, ESR, CRP  Imaging: I performed an independent interpretation of the patient's images. CXR:   XR CHEST STANDARD TWO VW 12/28/2021    Narrative  PA LATERAL CHEST  12/28/2021 12:16 PM    HISTORY:  SEE DIAGNOSIS  ; Outpatient--SOB and cough x 6 mths; Hx of asthma;  Smoker x 40+ years. COMPARISON: November 2, 2021    FINDINGS:  The heart size is within normal limits. There is no lobar  consolidation, pleural effusions or pulmonary edema. Multilevel degenerative  thoracic spondylosis is present. Impression  No consolidation.   11/2/21        CT Chest:   CT HEAD WO CONTRAST 12/13/2017    Narrative  CT head without contrast: 12/13/2017    History: Syncopal episode yesterday.    Technique: 5mm axial images were obtained from the skull base to the vertex  without intravenous contrast.  Radiation dose reduction techniques were used for  this study:  Our CT scanners use one or all of the following: Automated exposure  control, adjustment of the mA and/or kVp according to patient's size, iterative  reconstruction.    Comparison: None    Findings: The ventricles and sulci are normal in size and configuration. There  are no extra-axial fluid collections. There is no evidence to suggest an acute  major territorial infarct. There is no evidence of acute intracranial hemorrhage  or mass effect. The bony calvarium is intact. The visualized mastoid air cells  and paranasal sinuses are well pneumatized and aerated.    Impression  Impression: Unremarkable unenhanced CT scan of the brain.    Nuclear Medicine: No results found for this or any previous visit from the past 3650 days.    PFTs:   No flowsheet data found.  No results found for this or any previous visit. No results found for this or any previous visit.  FeNO: No results found for this or any previous visit.  FeNO and Likelihood of Eosinophilic Asthma   Unlikely Intermediate Likely   <25 ppb 25-50 ppb >50ppb   Exercise Oximetry:  Echo: No results found for this or any previous visit from the past 3650 days.    [facility] Reference Info:                                                                                                                  Past Medical History:   Diagnosis Date    Essential hypertension     Hyperlipidemia     Multinodular goiter     Multinodular goiter     TIA (transient ischemic attack) 12/13/2017        Tobacco Use      Smoking status: Former        Packs/day: 1.00        Years: 40.00        Pack years: 40        Types: Cigarettes        Quit date: 1/1/2020        Years since quitting: 3.0      Smokeless tobacco: Never    Allergies   Allergen Reactions    Ace Inhibitors  Other (See Comments)     On paperwork from PCP, pt does not remember if she is allergic. Current Outpatient Medications   Medication Instructions    albuterol sulfate  (90 Base) MCG/ACT inhaler 2 puffs, Inhalation    atorvastatin (LIPITOR) 40 mg, Oral, DAILY    Cholecalciferol 25 MCG (1000 UT) CHEW 1 tablet, Oral, DAILY    clopidogrel (PLAVIX) 75 mg, Oral, DAILY    cyclobenzaprine (FLEXERIL) 5 mg, Oral, 3 TIMES DAILY PRN    diclofenac (VOLTAREN) 75 MG EC tablet 1 tablet    fluticasone (FLONASE) 50 MCG/ACT nasal spray 2 sprays, Nasal, DAILY PRN    fluticasone (FLOVENT HFA) 220 MCG/ACT inhaler 2 puffs, Inhalation, 2 TIMES DAILY    hydroCHLOROthiazide (HYDRODIURIL) 25 mg, Oral, DAILY    ibuprofen (ADVIL;MOTRIN) 200 MG tablet 1 tablet    latanoprost (XALATAN) 0.005 % ophthalmic solution Ophthalmic    loratadine (CLARITIN) 10 mg, Oral, DAILY PRN    traMADol (ULTRAM) 50 mg, Oral, EVERY 8 HOURS PRN    vitamin E 400 UNIT capsule Oral, DAILY   Discussed with the patient the current USPSTF guidelines released March 9, 2021 for screening for lung cancer. For adults aged 48 to [de-identified] years who have a 20 pack-year smoking history and currently smoke or have quit within the past 15 years the grade B recommendation is to:  Screen for lung cancer with low-dose computed tomography (LDCT) every year. Stop screening once a person has not smoked for 15 years or has a health problem that limits life expectancy or the ability to have lung surgery. The patient  reports that she quit smoking about 3 years ago. Her smoking use included cigarettes. She has a 40.00 pack-year smoking history. She has never used smokeless tobacco.. Discussed with patient the risks and benefits of screening, including over-diagnosis, false positive rate, and total radiation exposure. The patient currently exhibits no signs or symptoms suggestive of lung cancer.   Discussed with patient the importance of compliance with yearly annual lung cancer screenings and willingness to undergo diagnosis and treatment if screening scan is positive. In addition, the patient was counseled regarding the importance of remaining smoke free and/or total smoking cessation.     Also reviewed the following if the patient has Medicare that as of February 10, 2022, Medicare only covers LDCT screening in patients aged 51-72 with at least a 20 pack-year smoking history who currently smoke or have quit in the last 15 years

## 2023-08-02 ENCOUNTER — TRANSCRIBE ORDERS (OUTPATIENT)
Dept: SCHEDULING | Age: 73
End: 2023-08-02

## 2023-08-02 DIAGNOSIS — Z12.31 SCREENING MAMMOGRAM FOR HIGH-RISK PATIENT: Primary | ICD-10-CM

## 2023-08-17 ENCOUNTER — HOSPITAL ENCOUNTER (OUTPATIENT)
Dept: MAMMOGRAPHY | Age: 73
Discharge: HOME | End: 2023-08-17
Attending: INTERNAL MEDICINE
Payer: MEDICARE

## 2023-08-17 VITALS — HEIGHT: 69 IN | WEIGHT: 215 LBS | BODY MASS INDEX: 31.84 KG/M2

## 2023-08-17 DIAGNOSIS — Z12.31 SCREENING MAMMOGRAM FOR HIGH-RISK PATIENT: ICD-10-CM

## 2023-08-17 PROCEDURE — 77067 SCR MAMMO BI INCL CAD: CPT

## 2023-09-04 ENCOUNTER — HOSPITAL ENCOUNTER (EMERGENCY)
Age: 73
Discharge: HOME | End: 2023-09-04
Attending: EMERGENCY MEDICINE
Payer: MEDICARE

## 2023-09-04 ENCOUNTER — APPOINTMENT (OUTPATIENT)
Dept: GENERAL RADIOLOGY | Age: 73
End: 2023-09-04
Payer: MEDICARE

## 2023-09-04 VITALS
BODY MASS INDEX: 33.62 KG/M2 | OXYGEN SATURATION: 96 % | HEART RATE: 97 BPM | WEIGHT: 227 LBS | HEIGHT: 69 IN | RESPIRATION RATE: 18 BRPM | DIASTOLIC BLOOD PRESSURE: 72 MMHG | TEMPERATURE: 98.4 F | SYSTOLIC BLOOD PRESSURE: 138 MMHG

## 2023-09-04 DIAGNOSIS — M25.512 ACUTE PAIN OF LEFT SHOULDER: Primary | ICD-10-CM

## 2023-09-04 PROCEDURE — 73030 X-RAY EXAM OF SHOULDER: CPT

## 2023-09-04 PROCEDURE — 99284 EMERGENCY DEPT VISIT MOD MDM: CPT

## 2023-09-04 PROCEDURE — 96372 THER/PROPH/DIAG INJ SC/IM: CPT

## 2023-09-04 PROCEDURE — 6360000002 HC RX W HCPCS: Performed by: EMERGENCY MEDICINE

## 2023-09-04 RX ORDER — KETOROLAC TROMETHAMINE 30 MG/ML
30 INJECTION, SOLUTION INTRAMUSCULAR; INTRAVENOUS
Status: COMPLETED | OUTPATIENT
Start: 2023-09-04 | End: 2023-09-04

## 2023-09-04 RX ORDER — MELOXICAM 7.5 MG/1
7.5 TABLET ORAL DAILY PRN
Qty: 30 TABLET | Refills: 0 | Status: SHIPPED | OUTPATIENT
Start: 2023-09-04 | End: 2023-10-04

## 2023-09-04 RX ORDER — HYDROCODONE BITARTRATE AND ACETAMINOPHEN 5; 325 MG/1; MG/1
1 TABLET ORAL EVERY 8 HOURS PRN
Qty: 9 TABLET | Refills: 0 | Status: SHIPPED | OUTPATIENT
Start: 2023-09-04 | End: 2023-09-07

## 2023-09-04 RX ADMIN — KETOROLAC TROMETHAMINE 30 MG: 30 INJECTION, SOLUTION INTRAMUSCULAR; INTRAVENOUS at 12:21

## 2023-09-04 ASSESSMENT — PAIN DESCRIPTION - LOCATION: LOCATION: SHOULDER

## 2023-09-04 ASSESSMENT — PAIN SCALES - GENERAL: PAINLEVEL_OUTOF10: 9

## 2023-09-04 ASSESSMENT — PAIN DESCRIPTION - ORIENTATION: ORIENTATION: LEFT

## 2023-09-04 ASSESSMENT — PAIN - FUNCTIONAL ASSESSMENT: PAIN_FUNCTIONAL_ASSESSMENT: 0-10

## 2023-09-04 NOTE — DISCHARGE INSTRUCTIONS
Take the NSAID as prescribed with food for the next week then as needed. Do the stretching exercises like we discussed. Take the least amount of narcotic pain medicine possible to control severe pain. You need to talk to your family doctor about possible referral to physical therapy if symptoms do not start to improve with the home exercises. Risk of opioid analgesics include, but are not limited to: Overdose they can stop or slow your breathing and lead to death; fractures from falls; drowsiness leading to injury; tolerance, dependence and addiction. You should not operate any motorized vehicles or work from a height greater than ground level when taking opioid analgesics as they increase your fall risks.

## 2023-09-04 NOTE — ED TRIAGE NOTES
Pt co left shoulder pain x4 days. Pt denies recent injury. Pt does report a fall about one year ago where she had injury to left shoulder.  Pt  is not able to raise left arm past chest level

## 2023-09-04 NOTE — ED PROVIDER NOTES
Emergency Department Provider Note       PCP: Bev Santoro MD   Age: 74 y.o. Sex: female     DISPOSITION Decision To Discharge 09/04/2023 02:04:29 PM       ICD-10-CM    1. Acute pain of left shoulder  M25.512 HYDROcodone-acetaminophen (NORCO) 5-325 MG per tablet          Medical Decision Making     Complexity of Problems Addressed:  1 acute injury    Data Reviewed and Analyzed:  I independently ordered and reviewed each unique test.         I interpreted the X-rays no fracture. Discussion of management or test interpretation. DDX:    Sprain, strain, tendon injury, contusion,    Abrasion, laceration, neurovascular injury, foreign body    Fracture, open fracture, dislocation, joint separation, articular surface injury,        Risk of Complications and/or Morbidity of Patient Management:  Prescription drug management performed. Shared medical decision making was utilized in creating the patients health plan today. ED Course as of 09/04/23 1407   Mon Sep 04, 2023   1404 I talked with the patient and her family about the findings in the emergency department. We talked about doing range of motion exercises for the shoulder and pain control. Patient expressed understanding and agreement with this plan. [KH]      ED Course User Index  [KH] Aníbal Rolling, DO       History      Ray Velazquez is a 74 y.o. female who presents to the Emergency Department with chief complaint of    Chief Complaint   Patient presents with    Shoulder Pain      Patient is a pleasant 24-year-old female presenting to the emergency department today complaining of left shoulder pain onset 3 to 4 days prior to arrival.  Patient denies any trauma or injury to the area. She had a fall about a year ago with pain to the left shoulder but no x-rays were obtained at that time. Patient has been taking extra strength Tylenol, Bengay and Biofreeze with minimal relief of her symptoms at home.   Patient has limited range of motion secondary to

## 2024-03-01 ENCOUNTER — HOSPITAL ENCOUNTER (OUTPATIENT)
Dept: CT IMAGING | Age: 74
End: 2024-03-01
Attending: INTERNAL MEDICINE
Payer: MEDICARE

## 2024-03-01 DIAGNOSIS — Z87.891 PERSONAL HISTORY OF TOBACCO USE: ICD-10-CM

## 2024-03-01 PROCEDURE — 71271 CT THORAX LUNG CANCER SCR C-: CPT

## 2024-03-04 ENCOUNTER — OFFICE VISIT (OUTPATIENT)
Dept: PULMONOLOGY | Age: 74
End: 2024-03-04
Payer: MEDICARE

## 2024-03-04 VITALS
WEIGHT: 209 LBS | TEMPERATURE: 97 F | BODY MASS INDEX: 30.96 KG/M2 | HEART RATE: 122 BPM | HEIGHT: 69 IN | SYSTOLIC BLOOD PRESSURE: 120 MMHG | RESPIRATION RATE: 17 BRPM | OXYGEN SATURATION: 94 % | DIASTOLIC BLOOD PRESSURE: 57 MMHG

## 2024-03-04 DIAGNOSIS — J44.9 CHRONIC OBSTRUCTIVE PULMONARY DISEASE, UNSPECIFIED COPD TYPE (HCC): ICD-10-CM

## 2024-03-04 DIAGNOSIS — Z85.850 HISTORY OF THYROID CANCER: ICD-10-CM

## 2024-03-04 DIAGNOSIS — Z87.891 PERSONAL HISTORY OF TOBACCO USE: ICD-10-CM

## 2024-03-04 DIAGNOSIS — R91.1 LUNG NODULE: Primary | ICD-10-CM

## 2024-03-04 PROCEDURE — G8419 CALC BMI OUT NRM PARAM NOF/U: HCPCS | Performed by: INTERNAL MEDICINE

## 2024-03-04 PROCEDURE — 3023F SPIROM DOC REV: CPT | Performed by: INTERNAL MEDICINE

## 2024-03-04 PROCEDURE — 99214 OFFICE O/P EST MOD 30 MIN: CPT | Performed by: INTERNAL MEDICINE

## 2024-03-04 PROCEDURE — 1123F ACP DISCUSS/DSCN MKR DOCD: CPT | Performed by: INTERNAL MEDICINE

## 2024-03-04 PROCEDURE — G8484 FLU IMMUNIZE NO ADMIN: HCPCS | Performed by: INTERNAL MEDICINE

## 2024-03-04 PROCEDURE — 3017F COLORECTAL CA SCREEN DOC REV: CPT | Performed by: INTERNAL MEDICINE

## 2024-03-04 PROCEDURE — 1090F PRES/ABSN URINE INCON ASSESS: CPT | Performed by: INTERNAL MEDICINE

## 2024-03-04 PROCEDURE — 4004F PT TOBACCO SCREEN RCVD TLK: CPT | Performed by: INTERNAL MEDICINE

## 2024-03-04 PROCEDURE — G8399 PT W/DXA RESULTS DOCUMENT: HCPCS | Performed by: INTERNAL MEDICINE

## 2024-03-04 PROCEDURE — G8427 DOCREV CUR MEDS BY ELIG CLIN: HCPCS | Performed by: INTERNAL MEDICINE

## 2024-03-04 RX ORDER — UMECLIDINIUM 62.5 UG/1
1 AEROSOL, POWDER ORAL DAILY
Qty: 1 EACH | Refills: 11 | Status: SHIPPED | OUTPATIENT
Start: 2024-03-04

## 2024-03-04 NOTE — PATIENT INSTRUCTIONS
Your PET Scan has been scheduled for 03/08/24 @ 2:00 pm at [facility].  Please arrive at 1:30 pm for check in.  You may eat a light breakfast but no carbs or sugar.  You cannot eat any food or drink anything but water 4 hours prior to your scan.  Also, no exercise or stress related activities 24 hours prior to the test.  The test will take at least 2-3 hours and please be sure to bring your insurance card and photo ID.  If you are not able to have this scan completed at this time please call Radiology Scheduling at [phone].      Address for the [facility] is:    [address], [place] [address]

## 2024-03-04 NOTE — RESULT ENCOUNTER NOTE
CT reviewed. I only see 1.25mm slice thickness. Can we make sure we get 0.625 slices? Thank you.     Devin Jeffers MD

## 2024-03-04 NOTE — PROGRESS NOTES
Name:  Faith Irizarry  YOB: 1950   MRN: [id number]      Office Visit: 3/4/2024        ASSESSMENT AND PLAN:  (Medical Decision Making)    Impression:   Pt with former tobacco abuse. COPD symptoms increased with more SOB and productive cough. Only on prn albuterol for now.   CT scan with bilateral lung nodules concerning for metastatic disease related to her past thyroid cancer.     -Reviewed CT scan images with her in person.  Will set her up for a PET scan as soon as possible and refer her back to her endocrinologist Dr. Forman to see if further testing needs to be done to prove that these are indeed thyroid mets.  -Will try to get her most recent CT scan as thin slice imaging that could be used for navigation bronchoscopy if this is necessary.  -Will start her on Incruse daily for increased COPD symptoms.  -Continue as needed albuterol.  Follow-up in 1 month    ASSESSMENT/PLAN:    1. Lung nodule  - PET CT SKULL BASE TO MID THIGH; Future    2. Personal history of tobacco use    3. Chronic obstructive pulmonary disease, unspecified COPD type (HCC)    4. History of thyroid cancer      Orders Placed This Encounter   Medications    umeclidinium bromide (INCRUSE ELLIPTA) 62.5 MCG/ACT inhaler     Sig: Inhale 1 puff into the lungs daily     Dispense:  1 each     Refill:  11       No orders of the defined types were placed in this encounter.    Follow-up and Dispositions    Return in about 4 weeks (around 4/1/2024) for PET asap. With Anyone.         Devin Jeffers MD    No specialty comments available.        _________________________________________________________________________    HISTORY OF PRESENT ILLNESS:    Ms. Faith Irizarry is a 73 y.o. female who is seen at [facility] for  Follow-up     She has a history of HTN, GERD, gout, HLD, asthma. S/p thyroidectomy in 2022 with thyroid cancer.   Referred for persistent RLL nodules on CXR Nov 2022.   Former smoker, quit end of 2021 after

## 2024-03-07 ENCOUNTER — HOSPITAL ENCOUNTER (OUTPATIENT)
Dept: PET IMAGING | Age: 74
Discharge: HOME | End: 2024-03-07
Payer: MEDICARE

## 2024-03-07 DIAGNOSIS — R91.1 LUNG NODULE: ICD-10-CM

## 2024-03-07 LAB
GLUCOSE BLD STRIP.AUTO-MCNC: 122 MG/DL (ref 65–100)
SERVICE CMNT-IMP: ABNORMAL

## 2024-03-07 PROCEDURE — 2580000003 HC RX 258: Performed by: INTERNAL MEDICINE

## 2024-03-07 PROCEDURE — A9609 HC RX DIAGNOSTIC RADIOPHARMACEUTICAL: HCPCS | Performed by: INTERNAL MEDICINE

## 2024-03-07 PROCEDURE — 3430000000 HC RX DIAGNOSTIC RADIOPHARMACEUTICAL: Performed by: INTERNAL MEDICINE

## 2024-03-07 PROCEDURE — 6360000004 HC RX CONTRAST MEDICATION: Performed by: INTERNAL MEDICINE

## 2024-03-07 PROCEDURE — 82962 GLUCOSE BLOOD TEST: CPT

## 2024-03-07 PROCEDURE — 78815 PET IMAGE W/CT SKULL-THIGH: CPT

## 2024-03-07 RX ORDER — FLUDEOXYGLUCOSE F 18 200 MCI/ML
13.07 INJECTION, SOLUTION INTRAVENOUS
Status: COMPLETED | OUTPATIENT
Start: 2024-03-07 | End: 2024-03-07

## 2024-03-07 RX ORDER — SODIUM CHLORIDE 0.9 % (FLUSH) 0.9 %
20 SYRINGE (ML) INJECTION AS NEEDED
Status: ACTIVE | OUTPATIENT
Start: 2024-03-07

## 2024-03-07 RX ADMIN — FLUDEOXYGLUCOSE F 18 13.07 MILLICURIE: 200 INJECTION, SOLUTION INTRAVENOUS at 10:51

## 2024-03-07 RX ADMIN — SODIUM CHLORIDE, PRESERVATIVE FREE 20 ML: 5 INJECTION INTRAVENOUS at 10:51

## 2024-03-07 RX ADMIN — DIATRIZOATE MEGLUMINE AND DIATRIZOATE SODIUM 10 ML: 660; 100 LIQUID ORAL; RECTAL at 10:51

## 2024-03-13 NOTE — RESULT ENCOUNTER NOTE
Can we let the patient know the results of the PET scan. There was activity in both parotid glands as well as some lymph nodes in the neck. Lung nodules were pet negative but I'm still worried about the possiibilyt of these being thryoid mets. Can we refer her to ent to look at the parotids and neck lymph nodes and present at tumor board to discuss other next steps. She sees Dr Forman with [facility], can we make sure she is seeing them back as well.   I do not see think slices from her most recent ct scan, can we get those if possible in case patsy is needed.   Devin Jeffers MD

## 2024-03-14 ENCOUNTER — TELEPHONE (OUTPATIENT)
Dept: PULMONOLOGY | Age: 74
End: 2024-03-14

## 2024-03-14 DIAGNOSIS — R94.8 ABNORMAL POSITRON EMISSION TOMOGRAPHY (PET) SCAN: Primary | ICD-10-CM

## 2024-03-14 NOTE — TELEPHONE ENCOUNTER
----- Message from Devin Jeffers MD sent at 3/13/2024 11:30 AM EDT -----  Can we let the patient know the results of the PET scan. There was activity in both parotid glands as well as some lymph nodes in the neck. Lung nodules were pet negative but I'm still worried about the possiibilyt of these being thryoid mets. Can we refer her to ent to look at the parotids and neck lymph nodes and present at tumor board to discuss other next steps. She sees Dr Forman with [facility], can we make sure she is seeing them back as well.   I do not see think slices from her most recent ct scan, can we get those if possible in case patsy is needed.   Devin Jeffers MD    I have spoken with Tiera OSUNA CT to [place]. I have left messages on both lines for a return call.

## 2024-03-20 NOTE — TELEPHONE ENCOUNTER
I have spoken with both patient and her son.  They are aware of results of PET scan results per Dr Jeffers and are agreeable to referral to ENT for evaluation of of the parotids and neck lymph nodes.  Patient will call Dr Forman's office for follow up appointment due to results of PET scan. I have sent staff message to [facility] for case presentation on per Dr Jeffers.

## 2024-04-03 ENCOUNTER — TELEPHONE (OUTPATIENT)
Dept: PULMONOLOGY | Age: 74
End: 2024-04-03

## 2024-04-03 NOTE — TELEPHONE ENCOUNTER
Patient discussed at thoracic conference.  Patient is 74 yo with HX of thyroid cancer, sees Dr Forman at [facility].  Patient has non-FDG avid pulmonary nodules that were not present in 2016 and not amendable to navigational bronchoscopy. Group is concerned these nodules could be mets from thyroid.  Per group if Dr Forman requires tissue from pulmonary nodules, would need to be CT guided of one of the right sided nodules. Patient has been referred to ENT and has appointment on 4/5 for PET + parotid findings.    I have spoken with Jackie at office of Dr Forman.  She will relay thoracic conference discussion to Dr Forman or his RN.  Patient is scheduled to see Dr Forman in office on 4/29 with LN mapping prior.

## 2024-04-05 ENCOUNTER — OFFICE VISIT (OUTPATIENT)
Dept: ENT CLINIC | Age: 74
End: 2024-04-05
Payer: MEDICARE

## 2024-04-05 VITALS — BODY MASS INDEX: 31.7 KG/M2 | WEIGHT: 214 LBS | HEIGHT: 69 IN

## 2024-04-05 DIAGNOSIS — D49.0 PAROTID NEOPLASM: Primary | ICD-10-CM

## 2024-04-05 DIAGNOSIS — C73 FOLLICULAR THYROID CANCER (HCC): ICD-10-CM

## 2024-04-05 PROCEDURE — 1090F PRES/ABSN URINE INCON ASSESS: CPT | Performed by: OTOLARYNGOLOGY

## 2024-04-05 PROCEDURE — G8417 CALC BMI ABV UP PARAM F/U: HCPCS | Performed by: OTOLARYNGOLOGY

## 2024-04-05 PROCEDURE — 4004F PT TOBACCO SCREEN RCVD TLK: CPT | Performed by: OTOLARYNGOLOGY

## 2024-04-05 PROCEDURE — G8399 PT W/DXA RESULTS DOCUMENT: HCPCS | Performed by: OTOLARYNGOLOGY

## 2024-04-05 PROCEDURE — G8428 CUR MEDS NOT DOCUMENT: HCPCS | Performed by: OTOLARYNGOLOGY

## 2024-04-05 PROCEDURE — 99204 OFFICE O/P NEW MOD 45 MIN: CPT | Performed by: OTOLARYNGOLOGY

## 2024-04-05 PROCEDURE — 1123F ACP DISCUSS/DSCN MKR DOCD: CPT | Performed by: OTOLARYNGOLOGY

## 2024-04-05 RX ORDER — ASPIRIN 81 MG/1
81 TABLET, CHEWABLE ORAL DAILY
COMMUNITY

## 2024-04-05 RX ORDER — LEVOTHYROXINE SODIUM 137 MCG
137 TABLET ORAL DAILY
COMMUNITY

## 2024-04-05 RX ORDER — MONTELUKAST SODIUM 10 MG/1
10 TABLET ORAL
COMMUNITY
Start: 2021-11-30

## 2024-04-05 RX ORDER — FAMOTIDINE 20 MG/1
20 TABLET, FILM COATED ORAL 2 TIMES DAILY
COMMUNITY

## 2024-04-05 RX ORDER — PRAVASTATIN SODIUM 40 MG
40 TABLET ORAL NIGHTLY
COMMUNITY
Start: 2021-10-20

## 2024-04-05 RX ORDER — LOSARTAN POTASSIUM AND HYDROCHLOROTHIAZIDE 12.5; 5 MG/1; MG/1
TABLET ORAL
COMMUNITY
Start: 2022-08-23

## 2024-04-05 ASSESSMENT — ENCOUNTER SYMPTOMS
SORE THROAT: 0
ABDOMINAL PAIN: 0

## 2024-04-05 NOTE — PROGRESS NOTES
Hx     Other Neg Hx     Breast Cancer Neg Hx     Thyroid Cancer Neg Hx     Diabetes Mother     Hypertension Mother     Cancer Brother         ROS:    Review of Systems   Constitutional:  Negative for fever.   HENT:  Negative for ear pain and sore throat.    Eyes:  Negative for visual disturbance.   Respiratory:  Negative for shortness of breath.    Cardiovascular:  Negative for chest pain.   Gastrointestinal:  Negative for abdominal pain.   Skin:  Negative for rash.   Neurological:  Negative for dizziness and headaches.   Hematological:  Negative for adenopathy.   Psychiatric/Behavioral:  Negative for agitation.         PHYSICAL EXAM:    Ht 1.753 m (5' 9\")   Wt 97.1 kg (214 lb)   BMI 31.60 kg/m²     General: NAD, well-appearing  Neuro: No gross neuro deficits. CN's II-XII intact. No facial weakness.  Eyes: EOMI. Pupils reactive. No periorbital edema/ecchymosis. No nystagmus.  Skin: No facial erythema, rashes or concerning lesions.  Nose: No external deviations or saddling. Intranasally, septum is midline without perforations, nasal mucosa appears healthy with no erythema, mucopurulence, or polyps.  Mouth: Moist mucus membranes, normal tongue/palate mobility, no concerning mucosal lesions. Oropharynx clear with no erythema/exudate, no tonsillar hypertrophy.  Ears: Normal appearing auricles, no hematomas. EACs clear with no cerumen impaction, healthy canal skin, TM's intact with no perforations or retraction pockets. No middle ear effusions.  Neck: Soft, supple, no palpable lateral neck masses.  There are bilateral (R > L) parotid masses.  Well-healed right parotid scar.  No palpable submandibular masses.  Well-healed thyroid scar.  No palpable underlying thyroid tissue.  Lymphatics: No palpable cervical LAD.  Resp: No audible stridor or wheezing.  CV: No murmurs, no JVD.  Extremities: No clubbing or cyanosis.    IMAGING:  PET/CT- 3/8/24    FINDINGS:   Head and neck: There is a normal distribution of FDG activity in

## 2024-04-05 NOTE — TELEPHONE ENCOUNTER
Noted that office of Dr Forman has documented conversation post thoracic conference.  Will close encounter.

## 2024-08-01 ENCOUNTER — TRANSCRIBE ORDERS (OUTPATIENT)
Dept: SCHEDULING | Age: 74
End: 2024-08-01

## 2024-08-01 DIAGNOSIS — Z12.31 SCREENING MAMMOGRAM FOR HIGH-RISK PATIENT: Primary | ICD-10-CM

## 2024-09-11 ENCOUNTER — HOSPITAL ENCOUNTER (OUTPATIENT)
Dept: MAMMOGRAPHY | Age: 74
Discharge: HOME | End: 2024-09-14
Attending: INTERNAL MEDICINE
Payer: MEDICARE

## 2024-09-11 DIAGNOSIS — Z12.31 SCREENING MAMMOGRAM FOR HIGH-RISK PATIENT: ICD-10-CM

## 2024-09-11 PROCEDURE — 77067 SCR MAMMO BI INCL CAD: CPT

## 2024-10-30 ENCOUNTER — OFFICE VISIT (OUTPATIENT)
Dept: ENT CLINIC | Age: 74
End: 2024-10-30
Payer: MEDICARE

## 2024-10-30 VITALS
BODY MASS INDEX: 31.07 KG/M2 | DIASTOLIC BLOOD PRESSURE: 88 MMHG | SYSTOLIC BLOOD PRESSURE: 170 MMHG | HEIGHT: 69 IN | WEIGHT: 209.8 LBS

## 2024-10-30 DIAGNOSIS — R49.0 HOARSENESS: ICD-10-CM

## 2024-10-30 DIAGNOSIS — D49.0 PAROTID NEOPLASM: Primary | ICD-10-CM

## 2024-10-30 PROCEDURE — G8399 PT W/DXA RESULTS DOCUMENT: HCPCS | Performed by: OTOLARYNGOLOGY

## 2024-10-30 PROCEDURE — 31575 DIAGNOSTIC LARYNGOSCOPY: CPT | Performed by: OTOLARYNGOLOGY

## 2024-10-30 PROCEDURE — G8417 CALC BMI ABV UP PARAM F/U: HCPCS | Performed by: OTOLARYNGOLOGY

## 2024-10-30 PROCEDURE — 1159F MED LIST DOCD IN RCRD: CPT | Performed by: OTOLARYNGOLOGY

## 2024-10-30 PROCEDURE — 99213 OFFICE O/P EST LOW 20 MIN: CPT | Performed by: OTOLARYNGOLOGY

## 2024-10-30 PROCEDURE — 4004F PT TOBACCO SCREEN RCVD TLK: CPT | Performed by: OTOLARYNGOLOGY

## 2024-10-30 PROCEDURE — 1123F ACP DISCUSS/DSCN MKR DOCD: CPT | Performed by: OTOLARYNGOLOGY

## 2024-10-30 PROCEDURE — G8484 FLU IMMUNIZE NO ADMIN: HCPCS | Performed by: OTOLARYNGOLOGY

## 2024-10-30 PROCEDURE — G8427 DOCREV CUR MEDS BY ELIG CLIN: HCPCS | Performed by: OTOLARYNGOLOGY

## 2024-10-30 PROCEDURE — 1090F PRES/ABSN URINE INCON ASSESS: CPT | Performed by: OTOLARYNGOLOGY

## 2024-10-30 ASSESSMENT — ENCOUNTER SYMPTOMS
GASTROINTESTINAL NEGATIVE: 1
ALLERGIC/IMMUNOLOGIC NEGATIVE: 1
EYES NEGATIVE: 1
RESPIRATORY NEGATIVE: 1

## 2024-10-30 NOTE — PROGRESS NOTES
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 female seen in follow-up for her neck.  She has known bilateral parotid masses and had previously undergone right superficial parotidectomy with Skinny borden in 2023.  She also has a history of a large follicular thyroid carcinoma which was treated by Dr. Dickson borden in January 2023.  I had seen her back on 4/5/2024 and had reviewed her PET/CT which revealed uptake within both parotid glands, and we elected for continued observation, as both lesions were most consistent with benign [place]'s tumors.  She has been doing well since her last visit with no increase in size of either mass.  She denies any localized pain or tenderness.  She continues to remain hoarse with a deeper, more raspy voice since she underwent thyroid surgery back in 2023.  There was concern at some point for possible paralyzed vocal cord.  She denies any pain with phonation, and there is no sore throat, or change in swallowing.  She has a chronic smoker, but denies any previous laryngeal pathology.    Past Medical History, Past Surgical History, Family history, Social History, and Medications were all reviewed with the patient today and updated as necessary.     Allergies   Allergen Reactions    Ace Inhibitors Other (See Comments)     On paperwork from PCP, pt does not remember if she is allergic.     Patient Active Problem List   Diagnosis    Lung nodule    Olecranon bursitis of right elbow    Multinodular goiter    Personal history of tobacco use    Chronic obstructive pulmonary disease (HCC)    History of thyroid cancer     Current Outpatient Medications   Medication Sig    aspirin 81 MG chewable tablet Take 1 tablet by mouth daily    SYNTHROID 137 MCG tablet Take 1 tablet by mouth daily    famotidine (PEPCID) 20 MG tablet Take 1 tablet by mouth 2 times daily

## 2025-04-30 ENCOUNTER — OFFICE VISIT (OUTPATIENT)
Dept: ENT CLINIC | Age: 75
End: 2025-04-30
Payer: MEDICARE

## 2025-04-30 VITALS — RESPIRATION RATE: 10 BRPM | WEIGHT: 206.4 LBS | BODY MASS INDEX: 30.57 KG/M2 | HEIGHT: 69 IN

## 2025-04-30 DIAGNOSIS — D49.0 PAROTID NEOPLASM: Primary | ICD-10-CM

## 2025-04-30 PROCEDURE — G8427 DOCREV CUR MEDS BY ELIG CLIN: HCPCS | Performed by: OTOLARYNGOLOGY

## 2025-04-30 PROCEDURE — 4004F PT TOBACCO SCREEN RCVD TLK: CPT | Performed by: OTOLARYNGOLOGY

## 2025-04-30 PROCEDURE — 1159F MED LIST DOCD IN RCRD: CPT | Performed by: OTOLARYNGOLOGY

## 2025-04-30 PROCEDURE — G8417 CALC BMI ABV UP PARAM F/U: HCPCS | Performed by: OTOLARYNGOLOGY

## 2025-04-30 PROCEDURE — G8399 PT W/DXA RESULTS DOCUMENT: HCPCS | Performed by: OTOLARYNGOLOGY

## 2025-04-30 PROCEDURE — 1090F PRES/ABSN URINE INCON ASSESS: CPT | Performed by: OTOLARYNGOLOGY

## 2025-04-30 PROCEDURE — 99213 OFFICE O/P EST LOW 20 MIN: CPT | Performed by: OTOLARYNGOLOGY

## 2025-04-30 PROCEDURE — 1123F ACP DISCUSS/DSCN MKR DOCD: CPT | Performed by: OTOLARYNGOLOGY

## 2025-04-30 ASSESSMENT — ENCOUNTER SYMPTOMS
RESPIRATORY NEGATIVE: 1
ALLERGIC/IMMUNOLOGIC NEGATIVE: 1
GASTROINTESTINAL NEGATIVE: 1
EYES NEGATIVE: 1

## 2025-04-30 NOTE — PROGRESS NOTES
Chief Complaint   Patient presents with    Follow-up     6 mos neck check        HPI:  Faith Irizarry is a 75 y.o. female seen in follow-up for her neck.  She has history of bilateral parotid neoplasms, most consistent with [place]'s tumors.  She had previously undergone right superficial parotidectomy with Skinny borden in 2023.  She also has a history of a large follicular thyroid carcinoma which was treated by Dr. Dickson borden in January 2023.  She had a PET/CT last spring which revealed uptake in both parotid glands.  She comes in today for reevaluation.  Both masses have remained stable in size since her last visit back on 10/30/2024, and she denies any localized pain or tenderness.  Unfortunately, she continues to smoke daily, although she is trying to quit.  She has done well from a thyroid standpoint with no evidence of recurrence.    Past Medical History, Past Surgical History, Family history, Social History, and Medications were all reviewed with the patient today and updated as necessary.     Allergies   Allergen Reactions    Ace Inhibitors Other (See Comments)     On paperwork from PCP, pt does not remember if she is allergic.     Patient Active Problem List   Diagnosis    Lung nodule    Olecranon bursitis of right elbow    Multinodular goiter    Personal history of tobacco use    Chronic obstructive pulmonary disease (HCC)    History of thyroid cancer     Current Outpatient Medications   Medication Sig    aspirin 81 MG chewable tablet Take 1 tablet by mouth daily    SYNTHROID 137 MCG tablet Take 1 tablet by mouth daily    famotidine (PEPCID) 20 MG tablet Take 1 tablet by mouth 2 times daily    losartan-hydroCHLOROthiazide (HYZAAR) 50-12.5 MG per tablet     LUTEIN PO Take 1 tablet by mouth daily    montelukast (SINGULAIR) 10 MG tablet 1 tablet    pravastatin (PRAVACHOL) 40 MG tablet Take 1 tablet by mouth nightly    umeclidinium bromide (INCRUSE ELLIPTA) 62.5 MCG/ACT inhaler Inhale 1 puff into the